# Patient Record
Sex: MALE | Race: OTHER | HISPANIC OR LATINO | URBAN - METROPOLITAN AREA
[De-identification: names, ages, dates, MRNs, and addresses within clinical notes are randomized per-mention and may not be internally consistent; named-entity substitution may affect disease eponyms.]

---

## 2018-02-11 ENCOUNTER — EMERGENCY (EMERGENCY)
Facility: HOSPITAL | Age: 48
LOS: 1 days | Discharge: HOME | End: 2018-02-11
Attending: EMERGENCY MEDICINE | Admitting: EMERGENCY MEDICINE
Payer: MEDICAID

## 2018-02-11 VITALS
DIASTOLIC BLOOD PRESSURE: 66 MMHG | HEART RATE: 93 BPM | WEIGHT: 164.91 LBS | HEIGHT: 75 IN | OXYGEN SATURATION: 99 % | SYSTOLIC BLOOD PRESSURE: 98 MMHG | RESPIRATION RATE: 20 BRPM | TEMPERATURE: 100 F

## 2018-02-11 VITALS
TEMPERATURE: 99 F | OXYGEN SATURATION: 99 % | DIASTOLIC BLOOD PRESSURE: 65 MMHG | HEART RATE: 96 BPM | RESPIRATION RATE: 19 BRPM | SYSTOLIC BLOOD PRESSURE: 109 MMHG

## 2018-02-11 DIAGNOSIS — R05 COUGH: ICD-10-CM

## 2018-02-11 DIAGNOSIS — R07.89 OTHER CHEST PAIN: ICD-10-CM

## 2018-02-11 DIAGNOSIS — J11.1 INFLUENZA DUE TO UNIDENTIFIED INFLUENZA VIRUS WITH OTHER RESPIRATORY MANIFESTATIONS: ICD-10-CM

## 2018-02-11 LAB
FLUAV H1 2009 PAND RNA SPEC QL NAA+PROBE: DETECTED
RAPID RVP RESULT: DETECTED

## 2018-02-11 PROCEDURE — 99284 EMERGENCY DEPT VISIT MOD MDM: CPT | Mod: 25

## 2018-02-11 PROCEDURE — 87798 DETECT AGENT NOS DNA AMP: CPT

## 2018-02-11 PROCEDURE — 71046 X-RAY EXAM CHEST 2 VIEWS: CPT | Mod: 26

## 2018-02-11 PROCEDURE — 87581 M.PNEUMON DNA AMP PROBE: CPT

## 2018-02-11 PROCEDURE — 71046 X-RAY EXAM CHEST 2 VIEWS: CPT

## 2018-02-11 PROCEDURE — 93005 ELECTROCARDIOGRAM TRACING: CPT

## 2018-02-11 PROCEDURE — 87486 CHLMYD PNEUM DNA AMP PROBE: CPT

## 2018-02-11 PROCEDURE — 87633 RESP VIRUS 12-25 TARGETS: CPT

## 2018-02-11 PROCEDURE — 93010 ELECTROCARDIOGRAM REPORT: CPT

## 2018-02-11 RX ORDER — IBUPROFEN 200 MG
800 TABLET ORAL ONCE
Qty: 0 | Refills: 0 | Status: COMPLETED | OUTPATIENT
Start: 2018-02-11 | End: 2018-02-11

## 2018-02-11 RX ORDER — CIPROFLOXACIN LACTATE 400MG/40ML
1 VIAL (ML) INTRAVENOUS
Qty: 6 | Refills: 0 | OUTPATIENT
Start: 2018-02-11 | End: 2018-02-16

## 2018-02-11 RX ADMIN — Medication 800 MILLIGRAM(S): at 19:32

## 2018-02-11 RX ADMIN — Medication 75 MILLIGRAM(S): at 22:17

## 2018-02-11 NOTE — ED PROVIDER NOTE - MEDICAL DECISION MAKING DETAILS
Impression: acute influenza with + rvp. CXR with increased markings posteriorly, suspicious for infiltrate. Afebrile. HDS. No resp distress. Is tolerating po in ed. Will tx w/ tamiflu and levaquin. Pt advised on supportive care and f/u with pcp for re-evaluation. Impression: acute influenza with + rvp. CXR with increased markings posteriorly, suspicious for infiltrate. + associated chest wall pain. EKG non-ischemic. Afebrile. HDS. No resp distress. Is tolerating po in ed. Will tx w/ tamiflu and levaquin. Pt advised on supportive care and f/u with pcp for re-evaluation.

## 2018-02-11 NOTE — ED ADULT TRIAGE NOTE - CHIEF COMPLAINT QUOTE
patient c/o cough , fever , chills and feeling tired for 2 days , also with chest pain and palpitations for 5 days .

## 2018-02-11 NOTE — ED PROVIDER NOTE - PHYSICAL EXAMINATION
VITAL SIGNS: I have reviewed nursing notes and confirm.  CONSTITUTIONAL: Well-developed; well-nourished; in no acute distress.   SKIN:  warm and dry, no acute rash.   HEAD:  normocephalic, atraumatic.  EYES: PERRL, EOM intact; conjunctiva and sclera clear.  ENT: No nasal discharge; airway clear.   NECK: Supple; non tender.  CARD: S1, S2 normal; no murmurs, gallops, or rubs. Regular rate and rhythm.   CHESTWALL: + reproducible ttp to mid sternal region.   RESP:  Clear to auscultation b/l, no wheezes, rales or rhonchi.  ABD: Normal bowel sounds; soft; non-distended; non-tender; no guarding/ rebound.  EXT: Normal ROM. No clubbing, cyanosis or edema. No calf tenderness/ cords. 2+ pulses to b/l ue/le.  NEURO: Alert, oriented, grossly unremarkable  PSYCH: Cooperative, mood and affect appropriate.

## 2018-02-11 NOTE — ED PROVIDER NOTE - OBJECTIVE STATEMENT
Pt is a 46yo m, h/o hiv (? cd4/ vl), who p/w 5 d of cough, subj warmth, chills, myalgias, fatigue, mid chest pain which is worse w/ coughing. No abd pain, vomiting, diarrhea, urinary sx's, flank pain. Pt is a 48yo m, h/o hiv (? cd4/ vl, however denies any h/o opportunistic infections), who p/w 5 d of cough, subj warmth, chills, myalgias, fatigue, mid chest pain which is worse w/ coughing. No abd pain, vomiting, diarrhea, urinary sx's, flank pain.

## 2018-02-11 NOTE — ED ADULT NURSE NOTE - OBJECTIVE STATEMENT
pt received in [facility] x 3 pt has a history of HIV , pt c/o productive cough for 5 days , chills , body aches and weakness , will continue to monitor

## 2018-02-11 NOTE — ED PROVIDER NOTE - DIAGNOSTIC INTERPRETATION
ER Physician: June Ree  CHEST XRAY INTERPRETATION: + infiltrate seen on lateral view, heart shadow normal, bony structures intact

## 2022-08-12 NOTE — ED ADULT NURSE NOTE - NS ED PATIENT SAFETY CONCERN
Tomi Welch   

## 2022-11-01 NOTE — ED ADULT TRIAGE NOTE - TEMPERATURE IN CELSIUS (DEGREES C)
eMERGENCY dEPARTMENT eNCOUnter      CHIEF COMPLAINT    Chief Complaint   Patient presents with   • Cough       HPI    Lizette Coates is a 55 year old male who presents complaining of cough and COPD exacerbation.  Has a history of COPD.  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  He has not had any chemotherapy for last month.  Never lasts he now is complaining of a cough and some shortness of breath.  He took 2 COVID test at home both being negative.  Denies fever or chills    ALLERGIES    ALLERGIES:   Allergen Reactions   • Hydralazine Hcl Other (See Comments)     Other reaction(s): Nausea and/or Vomiting  N/V and some facial swelling       CURRENT MEDICATIONS    No current facility-administered medications for this encounter.     Current Outpatient Medications   Medication Sig Dispense Refill   • azithromycin (Zithromax Z-Keegan) 250 MG tablet Take 2 tablets now, then one tablet daily for the next 4 days 6 tablet 0   • predniSONE (DELTASONE) 20 MG tablet Take 2 tablets by mouth daily. For three days 6 tablet 0   • electrolyte/PEG 3350 (Nulytely with Flavor Packs) 420 g solution Take 4,000 mLs by mouth as directed. Follow instructions from [facility]. 4000 mL 0   • losartan (COZAAR) 50 MG tablet TAKE 1 TABLET BY MOUTH TWICE A  tablet 1   • atorvastatin (LIPITOR) 40 MG tablet TAKE 1 TABLET BY MOUTH EVERY DAY 90 tablet 1   • Januvia 25 MG tablet TAKE 1 TABLET BY MOUTH EVERY DAY 30 tablet 0   • cloNIDine (Catapres-TTS-3) 0.3 MG/24HR Place 1 patch onto the skin 1 day a week. Box includes patch and non-medicated adhesive cover. Apply adhesive cover if patch begins to lift. 4 patch 11   • sevelamer carbonate (RENVELA) 800 MG tablet Take 1 tablet by mouth 3 times daily (with meals). Take 4 tablets by mouth three times daily with meals 1020 tablet 3   • lactulose (CHRONULAC) 10 GM/15ML solution Take 15 mLs by mouth daily. 450 mL 11   • amLODIPine (NORVASC) 10 MG tablet Take 1 tablet by mouth daily. 90 tablet 3   •  entecavir (BARACLUDE) 0.5 MG tablet Take 1 tablet by mouth 1 day a week. 4 tablet 11   • OLANZapine (ZyPREXA) 5 MG tablet Take 1 tablet by mouth nightly. Start the day of chemotherapy x 4 days for prevention of nausea/vomiting. 12 tablet 0   • prochlorperazine (COMPAZINE) 10 MG tablet Take 1 tablet by mouth every 6 hours as needed for Nausea or Vomiting. 30 tablet 5   • albuterol 108 (90 Base) MCG/ACT inhaler Inhale 2 puffs into the lungs Every 4 hours as needed for Shortness of Breath or Wheezing. 18 g 0   • acetaminophen (TYLENOL) 500 MG tablet Take 1,000 mg by mouth 2 times daily as needed (headache).     • labetalol (NORMODYNE) 100 MG tablet Take 1 tablet by mouth 2 times daily. (Patient taking differently: Take 200 mg by mouth in the morning and 200 mg in the evening.) 60 tablet 0   • lidocaine-prilocaine (EMLA) cream Apply to Mediport site 1 hour prior to access procedure 30 g 0   • ondansetron (Zofran) 4 MG tablet Take 1 tablet by mouth every 8 hours as needed for Nausea. 10 tablet 1   • tiotropium (Spiriva Respimat) 2.5 MCG/ACT inhaler Inhale 2 puffs into the lungs daily. 4 g 11   • calcium acetate gelcap (PHOSLO) 667 MG capsule Take 1,334 mg by mouth 3 times daily (with meals).      • Pramoxine HCl 1 % Lotion Apply to affected area three times a day. 1 Bottle 0   • Epoetin Lawrence (EPOGEN IJ) Give 8000 units IV push At Dialysis on Monday, Wednesday, and Friday's.     • triamcinolone (ARISTOCORT) 0.1 % ointment Apply topically to affected area on left lower leg BID for 2-3 weeks then PRN affter. 80 g 1   • B Complex-C-Folic Acid (DIALYVITE) tablet Take 1 tablet by mouth daily.     • Doxercalciferol (HECTOROL IV) Give 4 mcg IV push three days a week at dialysis Monday, Wednesday, Friday's     • cinacalcet (SENSIPAR) 60 MG tablet Take 60 mg by mouth every evening.      • aspirin 81 MG tablet Take 81 mg by mouth daily.         PAST MEDICAL HISTORY    Past Medical History:   Diagnosis Date   • Chronic kidney  disease    • Diabetes mellitus (CMS/HCC)    • Essential (primary) hypertension    • Hematochezia 9/29/2019   • Hepatitis B    • High cholesterol    • Liver cancer (CMS/HCC)        PROBLEM LIST  Patient Active Problem List   Diagnosis   • Essential hypertension   • Hepatitis B   • Type 2 diabetes mellitus, without long-term current use of insulin (CMS/HCC)   • Mixed hyperlipidemia   • ESRD (end stage renal disease) on dialysis (CMS/HCC)   • Adenomatous polyp of colon   • Class 2 obesity due to excess calories with body mass index (BMI) of 36.0 to 36.9 in adult   • Other specified abnormal findings of blood chemistry   • Peripheral edema   • Exercise hypoxemia   • Large cell lymphoma, splenic (CMS/HCC)   • AVF (arteriovenous fistula) (CMS/HCC)   • Coronary artery disease involving native coronary artery of native heart   • Pulmonary HTN (CMS/HCC)   • Stage 2 moderate COPD by GOLD classification (CMS/HCC)       SURGICAL HISTORY    Past Surgical History:   Procedure Laterality Date   • ------------other------------- Right     PD CATHETER Placement and Removed for dialysis    • Av fistula placement Left 2016   • Av fistula repair Left 08/20/2019    Revision of left arm brachiocephalic AVF with cephalic turn down to the left axillary vein   • Av fistula repair  05/04/2021    Revison of AVF   • Av fistula repair Left 11/02/2021    Revision of left arm brachiocephalic fistula with aneurysmorrhaphy and resection of tortuous and stenotic segment with primary cephalic vein to cephalic vein anastomosis   • Cholecystectomy  2016   • Colonoscopy w/ biopsies  09/26/2019    Dr. Osvaldo Solis    • Ir av graft/fistula (fistulagram w/ intervention) Left 06/27/2019    Left cephalic vein angioplasty with Letcher 6 x 40 mm angioplasty balloon       SOCIAL HISTORY    Social History     Tobacco Use   • Smoking status: Never Smoker   • Smokeless tobacco: Never Used   Vaping Use   • Vaping Use: never used   Substance Use Topics   • Alcohol  use: Not Currently   • Drug use: Never     Comment: NONE        FAMILY HISTORY    Family History   Problem Relation Age of Onset   • Kidney disease Father         ESRD-Passed   • Hypotension Father    • Hyperlipidemia Father    • Hypertension Sister    • Diabetes Sister    • Thyroid Sister    • Hypertension Brother    • Diabetes Brother    • Hypertension Brother    • Diabetes Brother    • Hypertension Sister    • Diabetes Sister        REVIEW OF SYSTEMS    Negative not pertinent noncontributory for all remaining 13 systems other than as stated above    PHYSICAL EXAM    ED Triage Vitals [11/01/22 1319]   /58   Heart Rate 73   Resp 20   Temp 97.8 °F (36.6 °C)   SpO2 (!) 82 %         Patient is alert orientated to person, place and time    Head:  normal cephalic, atraumatic    Eyes:  PERRLA, EOMI    Mucous Membranes: moist, no lesions    Oral pharynx:  noninjected    Neck: supple, no adenopathy    Heart:  Regular rate and rhythm, S1 S2, No murmers rubs or clicks    Lungs: Rhonchi noted on the right side      Abdomen:  Soft, nontender. No hepatosplenomegaly. No bruits. Bowel sounds present in all 4 quadrants   Negative Fraga sign, negative McBurney's point tenderness  No rebound no guarding    Extremities: No clubbing, cyanosis or edema     Skin: Is without cutaneous manifestations of systemic disease    Neurological: Cranial Nerves 2-12 grossly intact.  No motor or sensory deficients                 EKG     Normal sinus rhythm right bundle-branch block compared to 26 January 2022 right bundle branch block is present    RADIOLOGY    XR Chest AP or PA   Final Result   FINDINGS/IMPRESSION:       Mild enlargement of the cardiac silhouette. Pulmonary vascularity is upper   limits normal.      Surgical clips left axilla. Right Port-A-Cath tip in the SVC.      Mixed interstitial and airspace opacities in the right mid and lower lung   have developed since the prior. No pleural effusion or pneumothorax.      Stable  appearance of the bones.                      LABS    Results for orders placed or performed during the hospital encounter of 11/01/22   Comprehensive Metabolic Panel   Result Value    Fasting Status     Sodium 134 (L)    Potassium 3.5    Chloride 92 (L)    Carbon Dioxide 36 (H)    Anion Gap 10    Glucose 192 (H)    BUN 53 (H)    Creatinine 8.12 (H)    Glomerular Filtration Rate 7 (L)     Comment: eGFR <15 mL/min/1.73m2 = Kidney failure or Stage 5 CKD (chronic kidney disease). Estimated GFR calculated using the CKD-EPI-R (2021) equation that does not include race in the creatinine calculation.    BUN/ Creatinine Ratio 7    Calcium 8.8    Bilirubin, Total 0.6    GOT/AST 11    GPT/ALT 20    Alkaline Phosphatase 110    Albumin 3.1 (L)    Protein, Total 7.7    Globulin 4.6 (H)    A/G Ratio 0.7 (L)   COVID/Flu/RSV panel   Result Value    Rapid SARS-COV-2 by PCR Not Detected    Influenza A by PCR Not Detected    Influenza B by PCR Not Detected    RSV BY PCR Detected (A)    Isolation Guidelines      Comment: Do not use this test result as the sole decision-maker for discontinuation of isolation.   Clinical evaluation should be considered for other respiratory illness requiring transmission-based isolation.    -    No fever (<99.0 F/37.2 C) for at least 24 hours without the use of fever-reducing medications    AND  -    Respiratory symptoms have improved or resolved (e.g. cough, shortness of breath)     AND  -    COVID-19 negative test    See COVID-19 Deisolation Resource Guide    Procedural Comment      Comment: SARS-COV-2 nucleic acid has not been detected indicating the absence of COVID-19.    This test was performed using the [employer] Xpert Xpress SARS-CoV-2/Flu/RSV RT-PCR test that has been given Emergency Use Authorization (EUA) by the [place] Food and Drug Administration (FDA).  These tests are considered definitive and do not need to be confirmed by another method.   CBC with Automated Differential (performable  only)   Result Value    WBC 8.8    RBC 2.96 (L)    HGB 9.8 (L)    HCT 28.3 (L)    MCV 95.6    MCH 33.1    MCHC 34.6    RDW-CV 13.5    RDW-SD 47.0        NRBC 0    Neutrophil, Percent 70    Lymphocytes, Percent 9    Mono, Percent 14    Eosinophils, Percent 5    Basophils, Percent 1    Immature Granulocytes 1    Absolute Neutrophils 6.2    Absolute Lymphocytes 0.8 (L)    Absolute Monocytes 1.2 (H)    Absolute Eosinophils  0.4    Absolute Basophils 0.0    Absolute Immmature Granulocytes 0.1   TROPONIN I, HIGH SENSITIVITY   Result Value    Troponin I, High Sensitivity 30   ECG   Result Value    Ventricular Rate EKG/Min (BPM) 71    Atrial Rate (BPM) 71    CT-Interval (MSEC) 174    QRS-Interval (MSEC) 154    QT-Interval (MSEC) 440    QTc 478    P Axis (Degrees) 63    R Axis (Degrees) 99    T Axis (Degrees) 55    REPORT TEXT      Normal sinus rhythm  Right bundle branch block  Abnormal ECG  When compared with ECG of  26-JAN-2022 16:59,  Right bundle branch block  is now  present  Confirmed by KIRILL DEAL M.D. ([id number]),  Adenike Weiss ([id number]) on 11/1/2022 2:20:42 PM     ISTAT8 VENOUS  POINT OF CARE   Result Value    BUN - POINT OF CARE 43 (H)    SODIUM - POINT OF CARE 137    POTASSIUM - POINT OF CARE 3.6    CHLORIDE - POINT OF CARE 89 (L)    TCO2 - POINT OF CARE 34 (H)    ANION GAP - POINT OF CARE 19    HEMATOCRIT - POINT OF CARE 31.0 (L)    HEMOGLOBIN - POINT OF CARE 10.5 (L)    GLUCOSE - POINT OF CARE 190 (H)    CALCIUM, IONIZED - POINT OF CARE 1.09 (L)    Creatinine 8.60 (H)    Glomerular Filtration Rate 7 (L)     Comment: eGFR <15 mL/min/1.73m2 = Kidney failure or Stage 5 CKD (chronic kidney disease). Estimated GFR calculated using the CKD-EPI-R (2021) equation that does not include race in the creatinine calculation.         ED MEDICATIONS  ED Medication Orders (From admission, onward)    None          PROCEDURES        CONSULTS:      ED COURSE & MEDICAL DECISION MAKING        patient with RSV.   Underlying lung problems causing him to be hypoxic    Patient updated with findings.  He informs us that he is on oxygen at home as needed now has oxygen.  He would rather go home and deal with oxygen at home rather than stay in hospital for the oxygen.  I will put him on 3 day course of prednisone for COPD  With no white count but it appears that this is viral but with his underlying history I am also going to place him on a Z-Keegan  RECHECKS:      FINAL IMPRESSION        The primary encounter diagnosis was Acute bronchiolitis due to respiratory syncytial virus (RSV). A diagnosis of Hypoxia was also pertinent to this visit.        FOLLOW UP:  [facility]  [address] [address]  [phone]    If symptoms worsen     Patient was instructed to return to the ED immediately if symptoms worsen or any new unusual symptoms arise. All questions and concerns were addressed.       TREATMENT:     Summary of your Discharge Medications      Take these Medications      Details   azithromycin 250 MG tablet  Commonly known as: Zithromax Z-Keegan   Take 2 tablets now, then one tablet daily for the next 4 days     predniSONE 20 MG tablet  Commonly known as: DELTASONE   Take 2 tablets by mouth daily. For three days            Closure:  The patient understands that this is a provisional diagnosis. Provisional diagnosis can and do change. The diagnosis that you are discharged with today is based on the symptoms with which you presented today. Disease processes can and do change with time.  If any new symptoms occur or worsen, you should seek immediate attention for re-evaluation.       Elvis Wynn MD  11/01/22 0986     37.6